# Patient Record
Sex: MALE | NOT HISPANIC OR LATINO | URBAN - METROPOLITAN AREA
[De-identification: names, ages, dates, MRNs, and addresses within clinical notes are randomized per-mention and may not be internally consistent; named-entity substitution may affect disease eponyms.]

---

## 2024-11-19 ENCOUNTER — APPOINTMENT (RX ONLY)
Dept: URBAN - METROPOLITAN AREA CLINIC 332 | Facility: CLINIC | Age: 34
Setting detail: DERMATOLOGY
End: 2024-11-19

## 2024-11-19 DIAGNOSIS — L81.0 POSTINFLAMMATORY HYPERPIGMENTATION: ICD-10-CM

## 2024-11-19 DIAGNOSIS — L30.9 DERMATITIS, UNSPECIFIED: ICD-10-CM

## 2024-11-19 PROCEDURE — ? COUNSELING

## 2024-11-19 PROCEDURE — ? PRESCRIPTION

## 2024-11-19 PROCEDURE — 99204 OFFICE O/P NEW MOD 45 MIN: CPT

## 2024-11-19 PROCEDURE — ? PRESCRIPTION MEDICATION MANAGEMENT

## 2024-11-19 PROCEDURE — ? ADDITIONAL NOTES

## 2024-11-19 RX ORDER — TACROLIMUS 1 MG/G
OINTMENT TOPICAL
Qty: 100 | Refills: 1 | Status: ERX | COMMUNITY
Start: 2024-11-19

## 2024-11-19 RX ORDER — KETOCONAZOLE 20 MG/G
CREAM TOPICAL
Qty: 60 | Refills: 3 | Status: ERX | COMMUNITY
Start: 2024-11-19

## 2024-11-19 RX ADMIN — TACROLIMUS: 1 OINTMENT TOPICAL at 00:00

## 2024-11-19 RX ADMIN — KETOCONAZOLE: 20 CREAM TOPICAL at 00:00

## 2024-11-19 ASSESSMENT — LOCATION SIMPLE DESCRIPTION DERM
LOCATION SIMPLE: GROIN
LOCATION SIMPLE: SCROTUM

## 2024-11-19 ASSESSMENT — LOCATION DETAILED DESCRIPTION DERM
LOCATION DETAILED: RIGHT ANTERIOR SCROTUM
LOCATION DETAILED: RIGHT INGUINAL CREASE
LOCATION DETAILED: LEFT INGUINAL CREASE

## 2024-11-19 ASSESSMENT — LOCATION ZONE DERM
LOCATION ZONE: TRUNK
LOCATION ZONE: GENITALIA

## 2024-11-19 NOTE — PROCEDURE: COUNSELING
Detail Level: Zone
Moisturizer Recommendations: Vanicream (provided samples today)
Cleanser Recommendations: Dove soap
Detail Level: Detailed

## 2024-11-19 NOTE — PROCEDURE: PRESCRIPTION MEDICATION MANAGEMENT
Detail Level: Zone
Render In Strict Bullet Format?: No
Initiate Treatment: Ketoconazole 2 % topical cream - Apply daily to inguinal folds x 4 weeks. Repeat as needed for flares\\nAlso discussed use of over the counter anti fungal powder (Zeasorb powder)\\nTacrolimus 0.1 % topical ointment - Apply daily to affected area of the scrotum for 2-4 weeks, okay to repeat as needed for flares\\n\\nDiscussed importance of avoiding rubbing/scratching AA\\nPlan to follow up in 4-6 months but instructed to call if he flares prior to follow up and not responsive to above management

## 2024-11-19 NOTE — PROCEDURE: ADDITIONAL NOTES
Additional Notes: Discussed that I feel there is a component of tinea cruris in addition to focal LSC of the right scrotum. Reviewed importance of avoiding rubbing/scratching area. Instructed to call prior to follow up if flares and not responsive to treatment.
Render Risk Assessment In Note?: no
Detail Level: Simple

## 2024-11-19 NOTE — HPI: RASH
What Type Of Note Output Would You Prefer (Optional)?: Standard Output
Is The Patient Presenting As Previously Scheduled?: Yes
Is This A New Presentation, Or A Follow-Up?: Rash
Additional History: Patient states that during the summer season he flares when he is exercising and also when he wears jeans. He states that antifungal creams help but it only lasts for about three days.  He describes rash as being \"raw and red\"  when he is sweating due to friction but when it dries out it is itchy.